# Patient Record
Sex: FEMALE | Race: WHITE | NOT HISPANIC OR LATINO | URBAN - METROPOLITAN AREA
[De-identification: names, ages, dates, MRNs, and addresses within clinical notes are randomized per-mention and may not be internally consistent; named-entity substitution may affect disease eponyms.]

---

## 2024-11-05 ENCOUNTER — EMERGENCY (EMERGENCY)
Facility: HOSPITAL | Age: 49
LOS: 1 days | Discharge: HOME | End: 2024-11-05
Attending: STUDENT IN AN ORGANIZED HEALTH CARE EDUCATION/TRAINING PROGRAM | Admitting: STUDENT IN AN ORGANIZED HEALTH CARE EDUCATION/TRAINING PROGRAM
Payer: COMMERCIAL

## 2024-11-05 VITALS
SYSTOLIC BLOOD PRESSURE: 122 MMHG | DIASTOLIC BLOOD PRESSURE: 80 MMHG | WEIGHT: 121.92 LBS | OXYGEN SATURATION: 98 % | RESPIRATION RATE: 19 BRPM | HEART RATE: 106 BPM | TEMPERATURE: 98 F

## 2024-11-05 LAB
ANION GAP SERPL CALC-SCNC: 11 MMOL/L — SIGNIFICANT CHANGE UP (ref 5–17)
BASOPHILS # BLD AUTO: 0.03 K/UL — SIGNIFICANT CHANGE UP (ref 0–0.2)
BASOPHILS NFR BLD AUTO: 0.3 % — SIGNIFICANT CHANGE UP (ref 0–2)
BUN SERPL-MCNC: 12 MG/DL — SIGNIFICANT CHANGE UP (ref 7–23)
CALCIUM SERPL-MCNC: 8.5 MG/DL — SIGNIFICANT CHANGE UP (ref 8.4–10.5)
CHLORIDE SERPL-SCNC: 108 MMOL/L — SIGNIFICANT CHANGE UP (ref 96–108)
CO2 SERPL-SCNC: 25 MMOL/L — SIGNIFICANT CHANGE UP (ref 22–31)
CREAT SERPL-MCNC: 1.18 MG/DL — SIGNIFICANT CHANGE UP (ref 0.5–1.3)
EGFR: 57 ML/MIN/1.73M2 — LOW
EOSINOPHIL # BLD AUTO: 0.19 K/UL — SIGNIFICANT CHANGE UP (ref 0–0.5)
EOSINOPHIL NFR BLD AUTO: 2.1 % — SIGNIFICANT CHANGE UP (ref 0–6)
ETHANOL SERPL-MCNC: 51 MG/DL — HIGH (ref 0–10)
GLUCOSE SERPL-MCNC: 79 MG/DL — SIGNIFICANT CHANGE UP (ref 70–99)
HCT VFR BLD CALC: 36.5 % — SIGNIFICANT CHANGE UP (ref 34.5–45)
HGB BLD-MCNC: 12.1 G/DL — SIGNIFICANT CHANGE UP (ref 11.5–15.5)
IMM GRANULOCYTES NFR BLD AUTO: 0.2 % — SIGNIFICANT CHANGE UP (ref 0–0.9)
LACTATE SERPL-SCNC: 1.5 MMOL/L — SIGNIFICANT CHANGE UP (ref 0.5–2)
LYMPHOCYTES # BLD AUTO: 2.82 K/UL — SIGNIFICANT CHANGE UP (ref 1–3.3)
LYMPHOCYTES # BLD AUTO: 31.1 % — SIGNIFICANT CHANGE UP (ref 13–44)
MCHC RBC-ENTMCNC: 30 PG — SIGNIFICANT CHANGE UP (ref 27–34)
MCHC RBC-ENTMCNC: 33.2 G/DL — SIGNIFICANT CHANGE UP (ref 32–36)
MCV RBC AUTO: 90.6 FL — SIGNIFICANT CHANGE UP (ref 80–100)
MONOCYTES # BLD AUTO: 0.75 K/UL — SIGNIFICANT CHANGE UP (ref 0–0.9)
MONOCYTES NFR BLD AUTO: 8.3 % — SIGNIFICANT CHANGE UP (ref 2–14)
NEUTROPHILS # BLD AUTO: 5.27 K/UL — SIGNIFICANT CHANGE UP (ref 1.8–7.4)
NEUTROPHILS NFR BLD AUTO: 58 % — SIGNIFICANT CHANGE UP (ref 43–77)
NRBC # BLD: 0 /100 WBCS — SIGNIFICANT CHANGE UP (ref 0–0)
PLATELET # BLD AUTO: 256 K/UL — SIGNIFICANT CHANGE UP (ref 150–400)
POTASSIUM SERPL-MCNC: 4.1 MMOL/L — SIGNIFICANT CHANGE UP (ref 3.5–5.3)
POTASSIUM SERPL-SCNC: 4.1 MMOL/L — SIGNIFICANT CHANGE UP (ref 3.5–5.3)
RBC # BLD: 4.03 M/UL — SIGNIFICANT CHANGE UP (ref 3.8–5.2)
RBC # FLD: 14.1 % — SIGNIFICANT CHANGE UP (ref 10.3–14.5)
SODIUM SERPL-SCNC: 144 MMOL/L — SIGNIFICANT CHANGE UP (ref 135–145)
WBC # BLD: 9.08 K/UL — SIGNIFICANT CHANGE UP (ref 3.8–10.5)
WBC # FLD AUTO: 9.08 K/UL — SIGNIFICANT CHANGE UP (ref 3.8–10.5)

## 2024-11-05 PROCEDURE — 76830 TRANSVAGINAL US NON-OB: CPT | Mod: 26

## 2024-11-05 PROCEDURE — 76856 US EXAM PELVIC COMPLETE: CPT | Mod: 26

## 2024-11-05 PROCEDURE — 74177 CT ABD & PELVIS W/CONTRAST: CPT | Mod: 26,MC

## 2024-11-05 PROCEDURE — 99285 EMERGENCY DEPT VISIT HI MDM: CPT

## 2024-11-05 RX ORDER — HYDROMORPHONE HCL/0.9% NACL/PF 6 MG/30 ML
0.5 PATIENT CONTROLLED ANALGESIA SYRINGE INTRAVENOUS ONCE
Refills: 0 | Status: DISCONTINUED | OUTPATIENT
Start: 2024-11-05 | End: 2024-11-05

## 2024-11-05 RX ORDER — ONDANSETRON HYDROCHLORIDE 2 MG/ML
4 INJECTION, SOLUTION INTRAMUSCULAR; INTRAVENOUS ONCE
Refills: 0 | Status: COMPLETED | OUTPATIENT
Start: 2024-11-05 | End: 2024-11-05

## 2024-11-05 RX ORDER — IOHEXOL 300 MG/ML
30 INJECTION, SOLUTION INTRAVENOUS ONCE
Refills: 0 | Status: COMPLETED | OUTPATIENT
Start: 2024-11-05 | End: 2024-11-05

## 2024-11-05 RX ORDER — KETOROLAC TROMETHAMINE 30 MG/ML
15 INJECTION INTRAMUSCULAR; INTRAVENOUS ONCE
Refills: 0 | Status: DISCONTINUED | OUTPATIENT
Start: 2024-11-05 | End: 2024-11-05

## 2024-11-05 RX ADMIN — Medication 0.5 MILLIGRAM(S): at 22:58

## 2024-11-05 RX ADMIN — KETOROLAC TROMETHAMINE 15 MILLIGRAM(S): 30 INJECTION INTRAMUSCULAR; INTRAVENOUS at 22:58

## 2024-11-05 RX ADMIN — Medication 0.5 MILLIGRAM(S): at 19:56

## 2024-11-05 RX ADMIN — Medication 0.5 MILLIGRAM(S): at 22:15

## 2024-11-05 RX ADMIN — IOHEXOL 30 MILLILITER(S): 300 INJECTION, SOLUTION INTRAVENOUS at 19:55

## 2024-11-05 RX ADMIN — ONDANSETRON HYDROCHLORIDE 4 MILLIGRAM(S): 2 INJECTION, SOLUTION INTRAMUSCULAR; INTRAVENOUS at 19:55

## 2024-11-05 RX ADMIN — KETOROLAC TROMETHAMINE 15 MILLIGRAM(S): 30 INJECTION INTRAMUSCULAR; INTRAVENOUS at 21:20

## 2024-11-05 RX ADMIN — Medication 1000 MILLILITER(S): at 23:02

## 2024-11-05 NOTE — ED PROVIDER NOTE - NSFOLLOWUPINSTRUCTIONS_ED_ALL_ED_FT
Your pain may be due to several things:  Constipation  Hemorrhagic ovarian cyst  Fibroids  Other unknown causes    Stay well hydrated.  Use magnesium citrate tomorrow.  After that you can use MiraLax for a few days.  Please follow up with your gastroenterologist.    Return to the Emergency Department if you have any new or worsening symptoms, or if you have any concerns.  ======================  Abdominal Pain, Adult  A health care provider talking to a person during a medical exam.  Pain in the abdomen (abdominal pain) can be caused by many things. In most cases, it gets better with no treatment or by being treated at home. But in some cases, it can be serious.    Your health care provider will ask questions about your medical history and do a physical exam to try to figure out what is causing your pain.    Follow these instructions at home:  Medicines    Take over-the-counter and prescription medicines only as told by your provider.  Do not take medicines that help you poop (laxatives) unless told by your provider.  General instructions    Watch your condition for any changes.  Drink enough fluid to keep your pee (urine) pale yellow.  Contact a health care provider if:  Your pain changes, gets worse, or lasts longer than expected.  You have severe cramping or bloating in your abdomen, or you vomit.  Your pain gets worse with meals, after eating, or with certain foods.  You are constipated or have diarrhea for more than 2–3 days.  You are not hungry, or you lose weight without trying.  You have signs of dehydration. These may include:  Dark pee, very little pee, or no pee.  Cracked lips or dry mouth.  Sleepiness or weakness.  You have pain when you pee (urinate) or poop.  Your abdominal pain wakes you up at night.  You have blood in your pee.  You have a fever.  Get help right away if:  You cannot stop vomiting.  Your pain is only in one part of the abdomen. Pain on the right side could be caused by appendicitis.  You have bloody or black poop (stool), or poop that looks like tar.  You have trouble breathing.  You have chest pain.  These symptoms may be an emergency. Get help right away. Call 911.  Do not wait to see if the symptoms will go away.  Do not drive yourself to the hospital.  This information is not intended to replace advice given to you by your health care provider. Make sure you discuss any questions you have with your health care provider.

## 2024-11-05 NOTE — ED ADULT NURSE NOTE - OBJECTIVE STATEMENT
49y F presents to ED c/o abdominal pain. Pt with hx diverticulitis, multiple flares requiring hospitalization for pain, pt reports this pain is similar. Endorses sudden onset L sided abdominal pain 2 hrs PTA a/w nausea. Describes pain as "burning." Pt crying in pain on arrival and RN assessment. Pt AAOx4, speaking in full and clear sentences, NAD at this toni e.

## 2024-11-05 NOTE — ED PROVIDER NOTE - CLINICAL SUMMARY MEDICAL DECISION MAKING FREE TEXT BOX
Severe LLQ/pelvic pain; abrupt onset - similar to pain from diverticulitis in 2020 that was initially thought to be ovarian torsion.  Tachycardic, normotensive.  Very tender LLQ/pelvic region.  Plan: analgesics and antiemetics, labs to include lactate to r/o bowel ischemia; TVUS r/o ovarian torsion, to drink oral contrast while at US.  May require CT scan.

## 2024-11-05 NOTE — ED PROVIDER NOTE - PATIENT PORTAL LINK FT
You can access the FollowMyHealth Patient Portal offered by [facility] by registering at the following website: [email or web address]. By joining [employer]’s FollowMyHealth portal, you will also be able to view your health information using other applications (apps) compatible with our system.

## 2024-11-05 NOTE — ED PROVIDER NOTE - ATTENDING APP SHARED VISIT CONTRIBUTION OF CARE
I discussed the care of the pt directly with the ACP while the pt was in the ED. i have reviewed the ACP note and agree w/ the history, exam and plan of care other than as noted above.    LLQ pain   r/o diverticulitis, kidney stone, ovarian pathology  plan for labs CT US pain control

## 2024-11-05 NOTE — ED PROVIDER NOTE - OBJECTIVE STATEMENT
49-year-old female complaining of  left lower quadrant/pelvic pain that began abruptly approximately 2 hours ago.  Pain is severe..  She has had history of diverticulitis approximately 6 times; she was hospitalized at [facility] for over a month in 2020  for similar pain; she was initially thought to have ovarian torsion, subsequently had exploratory laparotomy where no ovarian torsion was found, she was found to have severe diverticulitis.  She has never had a bowel resection, her diverticulitis was treated with antibiotics previously.  She takes no medications, has not taken anything for the pain today.  She admits to drinking 1 glass of wine about 7 hours ago.  She is nauseated, but has not vomited.  She said she feels warm but has not taken her temperature.  She has had no changes in her bladder or bowel habits, no vaginal bleeding or discharge.  Menstrual periods reported as normal and regular.

## 2024-11-05 NOTE — ED PROVIDER NOTE - PHYSICAL EXAMINATION
CONSTITUTIONAL: uncomfortable-appearing, crying in pain  SKIN: Normal color and turgor.    HEAD: NC/AT.  EYES: Conjunctiva clear. Anicteric sclera.  ENT: Airway clear. Normal voice. MMM.  RESPIRATORY:  Normal respiratory rate and effort. CTAB  CARDIOVASCULAR:  RRR S1S2  GI:  Abdomen very tender LLQ/pelvic region with guarding.  MSK: Neck supple.  No LE edema or calf tenderness. No joint swelling or ROM limitation.  NEURO: Alert, clear mental status.  Speech clear. No focal deficits. Gait steady.

## 2024-11-05 NOTE — ED ADULT TRIAGE NOTE - CHIEF COMPLAINT QUOTE
Pt. a&ox4 ambulatory, hx of diverticulitis, p/w sudden onset abd pain with nausea x 2 hrs. Pt. crying in pain, needed to be assisted out of the care outside. Denies v/d. Alcohol suspected by smellABEBA aware, and pain UG.

## 2024-11-06 VITALS
SYSTOLIC BLOOD PRESSURE: 118 MMHG | RESPIRATION RATE: 16 BRPM | DIASTOLIC BLOOD PRESSURE: 78 MMHG | HEART RATE: 78 BPM | OXYGEN SATURATION: 100 %

## 2024-11-06 LAB
APPEARANCE UR: CLEAR — SIGNIFICANT CHANGE UP
BILIRUB UR-MCNC: NEGATIVE — SIGNIFICANT CHANGE UP
COLOR SPEC: YELLOW — SIGNIFICANT CHANGE UP
DIFF PNL FLD: NEGATIVE — SIGNIFICANT CHANGE UP
GLUCOSE UR QL: NEGATIVE MG/DL — SIGNIFICANT CHANGE UP
KETONES UR-MCNC: NEGATIVE MG/DL — SIGNIFICANT CHANGE UP
LEUKOCYTE ESTERASE UR-ACNC: NEGATIVE — SIGNIFICANT CHANGE UP
NITRITE UR-MCNC: NEGATIVE — SIGNIFICANT CHANGE UP
PH UR: 8 — SIGNIFICANT CHANGE UP (ref 5–8)
PROT UR-MCNC: NEGATIVE MG/DL — SIGNIFICANT CHANGE UP
SP GR SPEC: >1.03 — HIGH (ref 1–1.03)
UROBILINOGEN FLD QL: 0.2 MG/DL — SIGNIFICANT CHANGE UP (ref 0.2–1)

## 2024-11-06 PROCEDURE — 84702 CHORIONIC GONADOTROPIN TEST: CPT

## 2024-11-06 PROCEDURE — 76856 US EXAM PELVIC COMPLETE: CPT

## 2024-11-06 PROCEDURE — 80307 DRUG TEST PRSMV CHEM ANLYZR: CPT

## 2024-11-06 PROCEDURE — 76830 TRANSVAGINAL US NON-OB: CPT

## 2024-11-06 PROCEDURE — 99284 EMERGENCY DEPT VISIT MOD MDM: CPT | Mod: 25

## 2024-11-06 PROCEDURE — 81003 URINALYSIS AUTO W/O SCOPE: CPT

## 2024-11-06 PROCEDURE — 36415 COLL VENOUS BLD VENIPUNCTURE: CPT

## 2024-11-06 PROCEDURE — 96375 TX/PRO/DX INJ NEW DRUG ADDON: CPT

## 2024-11-06 PROCEDURE — 85025 COMPLETE CBC W/AUTO DIFF WBC: CPT

## 2024-11-06 PROCEDURE — 96361 HYDRATE IV INFUSION ADD-ON: CPT

## 2024-11-06 PROCEDURE — 96376 TX/PRO/DX INJ SAME DRUG ADON: CPT

## 2024-11-06 PROCEDURE — 74177 CT ABD & PELVIS W/CONTRAST: CPT | Mod: MC

## 2024-11-06 PROCEDURE — 83605 ASSAY OF LACTIC ACID: CPT

## 2024-11-06 PROCEDURE — 96374 THER/PROPH/DIAG INJ IV PUSH: CPT

## 2024-11-06 PROCEDURE — 80048 BASIC METABOLIC PNL TOTAL CA: CPT

## 2024-11-06 RX ORDER — OXYCODONE HYDROCHLORIDE 30 MG/1
5 TABLET ORAL ONCE
Refills: 0 | Status: DISCONTINUED | OUTPATIENT
Start: 2024-11-06 | End: 2024-11-06

## 2024-11-06 RX ORDER — IBUPROFEN 200 MG
2 TABLET ORAL
Qty: 30 | Refills: 0
Start: 2024-11-06

## 2024-11-06 RX ORDER — KETOROLAC TROMETHAMINE 30 MG/ML
15 INJECTION INTRAMUSCULAR; INTRAVENOUS ONCE
Refills: 0 | Status: DISCONTINUED | OUTPATIENT
Start: 2024-11-06 | End: 2024-11-06

## 2024-11-06 RX ORDER — MAGNESIUM CITRATE
150 SOLUTION, ORAL ORAL
Qty: 300 | Refills: 0
Start: 2024-11-06

## 2024-11-06 RX ORDER — ACETAMINOPHEN 500 MG
1000 TABLET ORAL ONCE
Refills: 0 | Status: COMPLETED | OUTPATIENT
Start: 2024-11-06 | End: 2024-11-06

## 2024-11-06 RX ORDER — DIPHENHYDRAMINE HCL 12.5MG/5ML
25 ELIXIR ORAL ONCE
Refills: 0 | Status: COMPLETED | OUTPATIENT
Start: 2024-11-06 | End: 2024-11-06

## 2024-11-06 RX ORDER — ONDANSETRON HYDROCHLORIDE 2 MG/ML
4 INJECTION, SOLUTION INTRAMUSCULAR; INTRAVENOUS ONCE
Refills: 0 | Status: COMPLETED | OUTPATIENT
Start: 2024-11-06 | End: 2024-11-06

## 2024-11-06 RX ADMIN — OXYCODONE HYDROCHLORIDE 5 MILLIGRAM(S): 30 TABLET ORAL at 03:29

## 2024-11-06 RX ADMIN — Medication 25 MILLIGRAM(S): at 00:17

## 2024-11-06 RX ADMIN — Medication 1000 MILLILITER(S): at 00:49

## 2024-11-06 RX ADMIN — Medication 400 MILLIGRAM(S): at 02:46

## 2024-11-06 RX ADMIN — ONDANSETRON HYDROCHLORIDE 4 MILLIGRAM(S): 2 INJECTION, SOLUTION INTRAMUSCULAR; INTRAVENOUS at 00:17

## 2024-11-08 DIAGNOSIS — R10.32 LEFT LOWER QUADRANT PAIN: ICD-10-CM

## 2024-11-08 DIAGNOSIS — Z88.1 ALLERGY STATUS TO OTHER ANTIBIOTIC AGENTS: ICD-10-CM

## 2024-11-08 DIAGNOSIS — R10.2 PELVIC AND PERINEAL PAIN: ICD-10-CM
